# Patient Record
Sex: MALE | Race: WHITE | Employment: OTHER | URBAN - METROPOLITAN AREA
[De-identification: names, ages, dates, MRNs, and addresses within clinical notes are randomized per-mention and may not be internally consistent; named-entity substitution may affect disease eponyms.]

---

## 2017-02-15 PROCEDURE — 81003 URINALYSIS AUTO W/O SCOPE: CPT | Performed by: INTERNAL MEDICINE

## 2017-02-22 PROBLEM — N18.3 CKD (CHRONIC KIDNEY DISEASE), STAGE 3 (MODERATE): Status: ACTIVE | Noted: 2017-02-22

## 2017-02-22 PROBLEM — M25.512 CHRONIC LEFT SHOULDER PAIN: Status: ACTIVE | Noted: 2017-02-22

## 2017-02-22 PROBLEM — G89.29 CHRONIC LEFT SHOULDER PAIN: Status: ACTIVE | Noted: 2017-02-22

## 2017-02-22 PROBLEM — IMO0001 UNCONTROLLED TYPE 2 DIABETES MELLITUS WITHOUT COMPLICATION, WITHOUT LONG-TERM CURRENT USE OF INSULIN: Status: ACTIVE | Noted: 2017-02-22

## 2017-03-01 PROBLEM — Z12.11 SCREEN FOR COLON CANCER: Status: ACTIVE | Noted: 2017-03-01

## 2017-03-14 PROCEDURE — 36415 COLL VENOUS BLD VENIPUNCTURE: CPT | Performed by: UROLOGY

## 2017-03-14 PROCEDURE — 84153 ASSAY OF PSA TOTAL: CPT | Performed by: UROLOGY

## 2017-03-16 PROBLEM — Z86.0100 HISTORY OF COLON POLYPS: Status: ACTIVE | Noted: 2017-03-16

## 2017-03-16 PROBLEM — Z86.010 HISTORY OF COLON POLYPS: Status: ACTIVE | Noted: 2017-03-16

## 2017-04-28 ENCOUNTER — HOSPITAL ENCOUNTER (OUTPATIENT)
Dept: MRI IMAGING | Age: 62
Discharge: HOME | End: 2017-04-28
Attending: ORTHOPAEDIC SURGERY
Payer: MEDICARE

## 2017-04-28 DIAGNOSIS — S83.249A MMT (MEDIAL MENISCUS TEAR): ICD-10-CM

## 2017-04-28 PROCEDURE — 73721 MRI JNT OF LWR EXTRE W/O DYE: CPT

## 2017-05-03 PROBLEM — I10 UNCONTROLLED HYPERTENSION: Status: ACTIVE | Noted: 2017-05-03

## 2017-05-17 PROCEDURE — 80307 DRUG TEST PRSMV CHEM ANLYZR: CPT | Performed by: INTERNAL MEDICINE

## 2017-05-19 PROBLEM — Z94.81 STATUS POST BONE MARROW TRANSPLANT (HCC): Status: ACTIVE | Noted: 2017-05-19

## 2017-05-19 PROBLEM — E79.0 HYPERURICEMIA: Status: ACTIVE | Noted: 2017-05-19

## 2017-05-19 PROBLEM — M17.12 PRIMARY OSTEOARTHRITIS OF LEFT KNEE: Status: ACTIVE | Noted: 2017-05-19

## 2017-08-23 PROBLEM — N18.30 STAGE 3 CHRONIC KIDNEY DISEASE (HCC): Status: ACTIVE | Noted: 2017-08-23

## 2017-12-13 PROCEDURE — 82043 UR ALBUMIN QUANTITATIVE: CPT | Performed by: INTERNAL MEDICINE

## 2017-12-13 PROCEDURE — 82570 ASSAY OF URINE CREATININE: CPT | Performed by: INTERNAL MEDICINE

## 2017-12-20 PROBLEM — I10 UNCONTROLLED HYPERTENSION: Status: RESOLVED | Noted: 2017-05-03 | Resolved: 2017-12-20

## 2017-12-20 PROBLEM — N18.3 CKD (CHRONIC KIDNEY DISEASE), STAGE 3 (MODERATE): Status: RESOLVED | Noted: 2017-02-22 | Resolved: 2017-12-20

## 2018-01-10 PROBLEM — IMO0001 UNCONTROLLED TYPE 2 DIABETES MELLITUS WITHOUT COMPLICATION, WITHOUT LONG-TERM CURRENT USE OF INSULIN: Status: RESOLVED | Noted: 2017-02-22 | Resolved: 2018-01-10

## 2018-01-11 PROBLEM — R26.9 GAIT DIFFICULTY: Status: ACTIVE | Noted: 2018-01-11

## 2018-01-11 PROBLEM — Z96.651 STATUS POST RIGHT KNEE REPLACEMENT: Status: ACTIVE | Noted: 2018-01-11

## 2018-01-11 PROCEDURE — 87081 CULTURE SCREEN ONLY: CPT | Performed by: INTERNAL MEDICINE

## 2018-01-11 PROCEDURE — 81001 URINALYSIS AUTO W/SCOPE: CPT | Performed by: INTERNAL MEDICINE

## 2018-01-11 PROCEDURE — 87086 URINE CULTURE/COLONY COUNT: CPT | Performed by: INTERNAL MEDICINE

## 2018-01-12 PROCEDURE — 87086 URINE CULTURE/COLONY COUNT: CPT | Performed by: INTERNAL MEDICINE

## 2018-05-23 PROBLEM — M21.70 ACQUIRED UNEQUAL LIMB LENGTH: Status: ACTIVE | Noted: 2018-05-23

## 2018-08-23 PROBLEM — F33.42 RECURRENT MAJOR DEPRESSIVE DISORDER, IN FULL REMISSION: Status: ACTIVE | Noted: 2018-08-23

## 2018-08-23 PROBLEM — F33.42 RECURRENT MAJOR DEPRESSIVE DISORDER, IN FULL REMISSION (HCC): Status: ACTIVE | Noted: 2018-08-23

## 2018-08-23 PROBLEM — M87.00 AVN (AVASCULAR NECROSIS OF BONE) (HCC): Status: ACTIVE | Noted: 2018-08-23

## 2018-09-20 PROBLEM — Z79.4 UNCONTROLLED TYPE 2 DIABETES MELLITUS WITH HYPERGLYCEMIA, WITH LONG-TERM CURRENT USE OF INSULIN (HCC): Status: ACTIVE | Noted: 2018-09-20

## 2018-09-20 PROBLEM — I77.9 CAROTID DISEASE, BILATERAL (HCC): Status: ACTIVE | Noted: 2018-09-20

## 2018-09-20 PROBLEM — E11.65 UNCONTROLLED TYPE 2 DIABETES MELLITUS WITH HYPERGLYCEMIA, WITH LONG-TERM CURRENT USE OF INSULIN (HCC): Status: ACTIVE | Noted: 2018-09-20

## 2018-09-20 PROBLEM — I70.0 ATHEROSCLEROSIS OF AORTA (HCC): Status: ACTIVE | Noted: 2018-09-20

## 2018-09-20 PROBLEM — I70.0 ATHEROSCLEROSIS OF AORTA: Status: ACTIVE | Noted: 2018-09-20

## 2018-09-20 PROBLEM — I65.23 BILATERAL CAROTID ARTERY STENOSIS: Status: ACTIVE | Noted: 2018-09-20

## 2018-09-20 PROBLEM — I77.9 CAROTID DISEASE, BILATERAL: Status: ACTIVE | Noted: 2018-09-20

## 2018-10-24 PROBLEM — I77.9 CAROTID DISEASE, BILATERAL (HCC): Status: RESOLVED | Noted: 2018-09-20 | Resolved: 2018-10-24

## 2018-10-24 PROBLEM — I77.9 CAROTID DISEASE, BILATERAL: Status: RESOLVED | Noted: 2018-09-20 | Resolved: 2018-10-24

## 2018-10-24 PROBLEM — E66.812 CLASS 2 OBESITY DUE TO EXCESS CALORIES WITHOUT SERIOUS COMORBIDITY IN ADULT: Status: ACTIVE | Noted: 2018-10-24

## 2018-10-24 PROBLEM — I65.23 BILATERAL CAROTID ARTERY STENOSIS: Status: RESOLVED | Noted: 2018-09-20 | Resolved: 2018-10-24

## 2018-10-24 PROBLEM — E66.09 CLASS 2 OBESITY DUE TO EXCESS CALORIES WITHOUT SERIOUS COMORBIDITY IN ADULT: Status: ACTIVE | Noted: 2018-10-24

## 2018-10-24 PROBLEM — I65.21 STENOSIS OF RIGHT CAROTID ARTERY: Status: ACTIVE | Noted: 2018-10-24

## 2018-11-28 PROCEDURE — 81003 URINALYSIS AUTO W/O SCOPE: CPT | Performed by: INTERNAL MEDICINE

## 2018-12-20 PROBLEM — E66.09 CLASS 1 OBESITY DUE TO EXCESS CALORIES WITHOUT SERIOUS COMORBIDITY WITH BODY MASS INDEX (BMI) OF 34.0 TO 34.9 IN ADULT: Status: ACTIVE | Noted: 2018-12-20

## 2018-12-20 PROBLEM — C32.9 CANCER OF LARYNX (HCC): Status: ACTIVE | Noted: 2018-12-20

## 2018-12-20 PROBLEM — M10.9 GOUT, UNSPECIFIED CAUSE, UNSPECIFIED CHRONICITY, UNSPECIFIED SITE: Status: ACTIVE | Noted: 2018-12-20

## 2018-12-20 PROBLEM — E66.811 CLASS 1 OBESITY DUE TO EXCESS CALORIES WITHOUT SERIOUS COMORBIDITY WITH BODY MASS INDEX (BMI) OF 34.0 TO 34.9 IN ADULT: Status: ACTIVE | Noted: 2018-12-20

## 2018-12-20 PROBLEM — Z96.652 STATUS POST LEFT KNEE REPLACEMENT: Status: ACTIVE | Noted: 2018-12-20

## 2019-02-21 PROBLEM — M25.512 CHRONIC LEFT SHOULDER PAIN: Status: RESOLVED | Noted: 2017-02-22 | Resolved: 2019-02-21

## 2019-02-21 PROBLEM — Z86.0100 HISTORY OF COLON POLYPS: Status: RESOLVED | Noted: 2017-03-16 | Resolved: 2019-02-21

## 2019-02-21 PROBLEM — E66.09 CLASS 1 OBESITY DUE TO EXCESS CALORIES WITHOUT SERIOUS COMORBIDITY WITH BODY MASS INDEX (BMI) OF 34.0 TO 34.9 IN ADULT: Status: RESOLVED | Noted: 2018-12-20 | Resolved: 2019-02-21

## 2019-02-21 PROBLEM — G89.29 CHRONIC LEFT SHOULDER PAIN: Status: RESOLVED | Noted: 2017-02-22 | Resolved: 2019-02-21

## 2019-02-21 PROBLEM — Z86.010 HISTORY OF COLON POLYPS: Status: RESOLVED | Noted: 2017-03-16 | Resolved: 2019-02-21

## 2019-02-21 PROBLEM — I65.21 STENOSIS OF RIGHT CAROTID ARTERY: Status: RESOLVED | Noted: 2018-10-24 | Resolved: 2019-02-21

## 2019-02-21 PROBLEM — M21.70 ACQUIRED UNEQUAL LIMB LENGTH: Status: RESOLVED | Noted: 2018-05-23 | Resolved: 2019-02-21

## 2019-02-21 PROBLEM — M17.12 PRIMARY OSTEOARTHRITIS OF LEFT KNEE: Status: RESOLVED | Noted: 2017-05-19 | Resolved: 2019-02-21

## 2019-02-21 PROBLEM — E66.811 CLASS 1 OBESITY DUE TO EXCESS CALORIES WITHOUT SERIOUS COMORBIDITY WITH BODY MASS INDEX (BMI) OF 34.0 TO 34.9 IN ADULT: Status: RESOLVED | Noted: 2018-12-20 | Resolved: 2019-02-21

## 2019-02-21 PROBLEM — R26.9 GAIT DIFFICULTY: Status: RESOLVED | Noted: 2018-01-11 | Resolved: 2019-02-21

## 2019-05-24 PROBLEM — I77.9 BILATERAL CAROTID ARTERY DISEASE: Status: ACTIVE | Noted: 2019-05-24

## 2019-05-24 PROBLEM — I77.9 BILATERAL CAROTID ARTERY DISEASE (HCC): Status: ACTIVE | Noted: 2019-05-24

## 2019-08-27 PROBLEM — I77.9 BILATERAL CAROTID ARTERY DISEASE: Status: RESOLVED | Noted: 2019-05-24 | Resolved: 2019-08-27

## 2019-08-27 PROBLEM — I65.21 STENOSIS OF RIGHT CAROTID ARTERY: Status: ACTIVE | Noted: 2019-08-27

## 2019-08-27 PROBLEM — I77.9 BILATERAL CAROTID ARTERY DISEASE (HCC): Status: RESOLVED | Noted: 2019-05-24 | Resolved: 2019-08-27

## 2019-09-09 PROCEDURE — 81001 URINALYSIS AUTO W/SCOPE: CPT | Performed by: INTERNAL MEDICINE

## 2019-09-11 PROBLEM — R00.2 PALPITATIONS: Status: ACTIVE | Noted: 2019-09-11

## 2019-12-04 PROBLEM — N18.30 STAGE 3 CHRONIC KIDNEY DISEASE (HCC): Status: RESOLVED | Noted: 2017-08-23 | Resolved: 2019-12-04

## 2020-06-04 PROBLEM — N52.9 ERECTILE DYSFUNCTION, UNSPECIFIED ERECTILE DYSFUNCTION TYPE: Status: ACTIVE | Noted: 2020-06-04

## 2020-09-03 PROBLEM — Z96.651 STATUS POST RIGHT KNEE REPLACEMENT: Status: RESOLVED | Noted: 2018-01-11 | Resolved: 2020-09-03

## 2020-09-03 PROBLEM — Z96.653 STATUS POST BILATERAL KNEE REPLACEMENTS: Status: ACTIVE | Noted: 2020-09-03

## 2020-09-03 PROBLEM — M16.11 ARTHRITIS OF RIGHT HIP: Status: ACTIVE | Noted: 2020-09-03

## 2020-09-03 PROBLEM — Z96.652 STATUS POST LEFT KNEE REPLACEMENT: Status: RESOLVED | Noted: 2018-12-20 | Resolved: 2020-09-03

## 2020-09-03 PROBLEM — I65.21 STENOSIS OF RIGHT CAROTID ARTERY: Status: RESOLVED | Noted: 2019-08-27 | Resolved: 2020-09-03

## 2020-12-31 PROBLEM — C32.9 CANCER OF LARYNX (HCC): Status: RESOLVED | Noted: 2018-12-20 | Resolved: 2020-12-31

## 2021-01-14 PROBLEM — Z96.641 STATUS POST RIGHT HIP REPLACEMENT: Status: ACTIVE | Noted: 2021-01-14

## 2021-01-14 PROBLEM — N18.31 STAGE 3A CHRONIC KIDNEY DISEASE (HCC): Status: ACTIVE | Noted: 2021-01-14

## 2021-01-14 PROBLEM — M81.0 AGE-RELATED OSTEOPOROSIS WITHOUT CURRENT PATHOLOGICAL FRACTURE: Status: ACTIVE | Noted: 2021-01-14

## 2021-09-23 PROBLEM — K44.9 HIATAL HERNIA: Status: ACTIVE | Noted: 2021-09-23

## 2021-09-23 PROBLEM — E78.5 DYSLIPIDEMIA: Status: ACTIVE | Noted: 2021-09-23

## 2021-09-23 PROBLEM — H04.123 DRY EYES: Status: ACTIVE | Noted: 2021-09-23

## 2021-12-23 PROBLEM — E78.5 DYSLIPIDEMIA: Status: RESOLVED | Noted: 2021-09-23 | Resolved: 2021-12-23

## 2025-03-26 ENCOUNTER — HOSPITAL ENCOUNTER (EMERGENCY)
Age: 70
Discharge: HOME | End: 2025-03-26
Payer: MEDICARE

## 2025-03-26 VITALS
DIASTOLIC BLOOD PRESSURE: 96 MMHG | WEIGHT: 219 LBS | SYSTOLIC BLOOD PRESSURE: 189 MMHG | TEMPERATURE: 98 F | OXYGEN SATURATION: 100 % | HEART RATE: 78 BPM | RESPIRATION RATE: 16 BRPM | HEIGHT: 67 IN | BODY MASS INDEX: 34.37 KG/M2

## 2025-03-26 DIAGNOSIS — R21 FACIAL RASH: Primary | ICD-10-CM

## 2025-03-26 PROCEDURE — 99283 EMERGENCY DEPT VISIT LOW MDM: CPT

## 2025-03-26 RX ORDER — METHYLPREDNISOLONE 4 MG/1
TABLET ORAL
Qty: 1 EACH | Refills: 0 | Status: SHIPPED | OUTPATIENT
Start: 2025-03-26

## 2025-03-26 NOTE — DISCHARGE INSTRUCTIONS
Follow the provided steroid taper closely.  25 mg of Benadryl every 6 hours.  Avoid all detergents or cosmetics on the face.  If the rash spreads more systemically please seek reevaluation.  If the rash were to spread more systemically we would consider the recent penicillin as a potential allergy.  If at any point you have a sensation of throat, tongue or lip swelling immediately seek reevaluation

## 2025-03-26 NOTE — ED PROVIDER NOTES
Patient Seen in: [facility] [place]      History     Chief Complaint   Patient presents with    Rash Skin Problem     Stated Complaint: rash to face since monday    Subjective:   HPI      70-year-old gentleman.  Medical history of CLL, hypertension, throat cancer.  Patient arrives to the ER for evaluation of a newly developed rash.  Mostly involving his face.  Developed in the last 48 hours.  Thursday last week he took a single 2 g dosage of amoxicillin prior to dental work.  No other new medications or cosmetics.  He denies any rash to the torso or extremities.  No point to the of a sensation of throat, tongue or lip swelling.  He has not attempted any interventions.    Objective:     Past Medical History:    Acquired unequal limb length    Chronic pain syndrome    CLL (chronic lymphocytic leukemia) (HCC)    Polyarthralgia    Throat cancer (HCC)    Unspecified essential hypertension              Past Surgical History:   Procedure Laterality Date    Bone marrow transplant  [id number]    Colonoscopy      Colonoscopy  08/31/2020    Egd  08/31/2020    Knee arthroscopy      Knee replacement surgery      Shoulder arthroscopy      Tonsillectomy      Vasectomy                  Social History     Socioeconomic History    Marital status:    Tobacco Use    Smoking status: Former    Smokeless tobacco: Never   Vaping Use    Vaping status: Never Used   Substance and Sexual Activity    Alcohol use: Yes     Comment: socially    Drug use: Yes     Types: Cannabis     Comment: gummy                  Physical Exam     ED Triage Vitals [03/26/25 1409]   BP (!) 189/96   Pulse 78   Resp 16   Temp 98.1 °F (36.7 °C)   Temp src Oral   SpO2 100 %   O2 Device None (Room air)       Current Vitals:   Vital Signs  BP: (!) 189/96  Pulse: 78  Resp: 16  Temp: 98.1 °F (36.7 °C)  Temp src: Oral    Oxygen Therapy  SpO2: 100 %  O2 Device: None (Room air)        Physical Exam     Gen: Well appearing, well groomed, alert and  aware x 3  Lung: No distress, RR, no retraction  Extremities: Full ROM, no deformity, NVI  Skin: Papular erythematous eruption noted diffusely across the face, anterior neck and nape.  Some involvement along the lateral hairline but spares the scalp apex.  No involvement of the extremities or torso  Posterior oropharynx demonstrates no involvement.  No angioedema.  Neuro:  Normal Gait  ED Course   Labs Reviewed - No data to display            MDM      Blood pressure mildly elevated.  Patient claims whitecoat hypertension.  He is compliant with his blood pressure medication.  He was instructed to recheck blood pressure these next 2 days while at home    Papular erythematous eruption noted diffusely across the face, anterior neck and nape.  Some involvement along the lateral hairline but spares the scalp apex.  No involvement of the extremities or torso    Posterior oropharynx demonstrates no involvement.  No angioedema.    I would expect an antibiotic reaction to be more systemic.  This is fairly isolated.  No vesicular eruptions.  No ulcerations.    We will have the patient starting Medrol Dosepak and antihistamines.  Cool compresses.  Avoid all cosmetics and detergents to the face.  If rash spreads more systemically, immediately seek reevaluation    Follow the provided steroid taper closely.  25 mg of Benadryl every 6 hours.  Avoid all detergents or cosmetics on the face.  If the rash spreads more systemically please seek reevaluation.  If the rash were to spread more systemically we would consider the recent penicillin as a potential allergy.  If at any point you have a sensation of throat, tongue or lip swelling immediately seek reevaluation  Medical Decision Making      Disposition and Plan     Clinical Impression:  No diagnosis found.     Disposition:  There is no disposition on file for this visit.  There is no disposition time on file for this visit.    Follow-up:  Marcelino Beach MD [address]  [place] [address]  [phone]    Follow up            Medications Prescribed:  Current Discharge Medication List        START taking these medications    Details   methylPREDNISolone (MEDROL) 4 MG Oral Tablet Therapy Pack Dosepack: take as directed  Qty: 1 each, Refills: 0                 Supplementary Documentation: